# Patient Record
Sex: FEMALE | Race: WHITE | NOT HISPANIC OR LATINO | Employment: FULL TIME | URBAN - METROPOLITAN AREA
[De-identification: names, ages, dates, MRNs, and addresses within clinical notes are randomized per-mention and may not be internally consistent; named-entity substitution may affect disease eponyms.]

---

## 2022-10-20 ENCOUNTER — OFFICE VISIT (OUTPATIENT)
Dept: FAMILY MEDICINE CLINIC | Facility: CLINIC | Age: 47
End: 2022-10-20

## 2022-10-20 VITALS
SYSTOLIC BLOOD PRESSURE: 145 MMHG | BODY MASS INDEX: 36.37 KG/M2 | OXYGEN SATURATION: 98 % | TEMPERATURE: 95.7 F | WEIGHT: 213 LBS | HEIGHT: 64 IN | DIASTOLIC BLOOD PRESSURE: 90 MMHG | HEART RATE: 88 BPM

## 2022-10-20 DIAGNOSIS — H65.93 BILATERAL NON-SUPPURATIVE OTITIS MEDIA: Primary | ICD-10-CM

## 2022-10-20 PROBLEM — E66.9 OBESITY (BMI 30-39.9): Status: ACTIVE | Noted: 2022-10-20

## 2022-10-20 PROBLEM — Z80.3 FAMILY HISTORY OF BREAST CANCER: Status: ACTIVE | Noted: 2019-10-14

## 2022-10-20 PROBLEM — I10 BENIGN ESSENTIAL HYPERTENSION: Status: ACTIVE | Noted: 2022-06-29

## 2022-10-20 PROCEDURE — AMOXICILLIN 500MG 21 AMOXICILLIN 500MG 21: Performed by: NURSE PRACTITIONER

## 2022-10-20 PROCEDURE — 99203 OFFICE O/P NEW LOW 30 MIN: CPT | Performed by: NURSE PRACTITIONER

## 2022-10-20 RX ORDER — LOSARTAN POTASSIUM 100 MG/1
1 TABLET ORAL DAILY
COMMUNITY
Start: 2022-07-28 | End: 2023-07-28

## 2022-10-20 RX ORDER — AMOXICILLIN 500 MG/1
500 TABLET, FILM COATED ORAL 3 TIMES DAILY
Qty: 21 TABLET | Refills: 0
Start: 2022-10-22 | End: 2022-10-29

## 2022-10-20 NOTE — PROGRESS NOTES
Name: Xin Guan      : 1975      MRN: [id number]  Encounter Provider: RADHA Guido  Encounter Date: 10/20/2022   Encounter department: [address] [place] [address] test not indicated today as the patient has already had symptoms for 5 days  Encouraged patient to wear a mask for the next 5 days just to be safe  Primary differential diagnoses at this time:  Viral URI, acute rhinosinusitis, bilateral otitis media  Etiology is likely viral at this point but I did prescribe the patient a 7 day regimen of amoxicillin to start on Saturday if signs and symptoms fail to improve  Discussed with signs and symptoms warrant immediate medical attention, patient verbalized understanding  1  Bilateral non-suppurative otitis media  -     amoxicillin (AMOXIL) 500 MG tablet; Take 1 tablet (500 mg total) by mouth 3 (three) times a day for 7 days Do not start before 2022  Subjective        Ear Pain  Patient complains of ear pain and possible ear infection  Symptoms include bilateral ear itchiness and decreased hearing  Onset of symptoms was 5 days ago, and have been gradually worsening since that time  Associated symptoms include: post nasal drip, congestion, sinus pressure and fatigue  Patient denies: achiness, chills and fever   She is drinking plenty of fluids  Several sick contacts at work  Has not checked covid test  Never diagnosed with covid  Had 2 covid vaccines  No recent abx use  Hx of recurrent ear infections (2-3x year)    Review of Systems   Constitutional: Positive for fatigue  Negative for chills and fever  HENT: Positive for congestion, ear pain, postnasal drip, sinus pressure and sore throat  Negative for ear discharge  Eyes: Negative  Respiratory: Negative  Cardiovascular: Negative  Gastrointestinal: Negative  Endocrine: Negative  Genitourinary: Negative  Musculoskeletal: Negative  Skin: Negative  Allergic/Immunologic: Negative  Neurological: Negative  Hematological: Negative  Psychiatric/Behavioral: Negative  Current Outpatient Medications on File Prior to Visit   Medication Sig   • B Complex-C (VITAMIN B + C COMPLEX PO) Take 1 capsule by mouth in the morning   • losartan (COZAAR) 100 MG tablet Take 1 tablet by mouth daily       Objective     /90 (BP Location: Right arm, Patient Position: Sitting, Cuff Size: Large)   Pulse 88   Temp (!) 95 7 °F (35 4 °C) (Axillary)   Ht 5' 4" (1 626 m)   Wt 96 6 kg (213 lb)   SpO2 98%   BMI 36 56 kg/m²     Physical Exam  Constitutional:       General: She is not in acute distress  Appearance: Normal appearance  HENT:      Head: Normocephalic  Right Ear: Tympanic membrane normal  There is no impacted cerumen  Left Ear: Tympanic membrane normal  There is no impacted cerumen  Ears:      Comments: B/l ear canals red and inflammed     Nose: Congestion present  Mouth/Throat:      Mouth: Mucous membranes are moist       Pharynx: Oropharynx is clear  Posterior oropharyngeal erythema present  No oropharyngeal exudate  Eyes:      Conjunctiva/sclera: Conjunctivae normal    Cardiovascular:      Rate and Rhythm: Normal rate and regular rhythm  Pulses: Normal pulses  Heart sounds: Normal heart sounds  Pulmonary:      Effort: Pulmonary effort is normal  No respiratory distress  Breath sounds: No wheezing  Musculoskeletal:         General: Normal range of motion  Cervical back: Normal range of motion  Lymphadenopathy:      Cervical: No cervical adenopathy  Skin:     General: Skin is warm and dry  Findings: No rash  Neurological:      Mental Status: She is alert and oriented to person, place, and time     Psychiatric:         Mood and Affect: Mood normal          Behavior: Behavior normal        RADHA Mistry

## 2022-10-20 NOTE — PATIENT INSTRUCTIONS
Treatment- Increase fluids, rest, acetaminophen or ibuprofen for fever/aches, OTC cold remedies OK but not too helpful, avoid OTC nasal sprays-may use them for 1-2 days only due to rebound effect  Chicken soup, tea with honey and lemon (grandma's remedies work as well if not better than OTC's)      Viral symptoms should resolve in 1-2 weeks, please contact the office if issues persist

## 2024-01-12 NOTE — PROGRESS NOTES
ADULT ANNUAL PHYSICAL  [facility] IN PARTNERSHIP WITH [facility]'S    NAME: Esther Angeles  AGE: 48 y.o. SEX: female  : 1975     DATE: 2024     Assessment and Plan:     Problem List Items Addressed This Visit       Benign essential hypertension    Relevant Orders    CBC and differential    Comprehensive metabolic panel    Lipid panel    POCT hemoglobin A1c    T3    TSH+Free T4    Ambulatory referral to complex care management program    Obesity (BMI 30-39.9)    Relevant Orders    CBC and differential    Comprehensive metabolic panel    Lipid panel    POCT hemoglobin A1c    T3    TSH+Free T4    Ambulatory referral to complex care management program    Prediabetes     A1C is 6.1 today.          Other Visit Diagnoses       Annual physical exam    -  Primary    Relevant Orders    CBC and differential    Comprehensive metabolic panel    Lipid panel    POCT hemoglobin A1c    Screening mammogram for breast cancer        Patient has mammograms done annually.  Ordered by GYN.    Cervical cancer screening        Up-to-date on Pap    Encounter for vaccination        Declined Tdap    Colon cancer screening        Declined colon cancer screening.            Immunizations and preventive care screenings were discussed with patient today. Appropriate education was printed on patient's after visit summary.    Counseling:  Alcohol/drug use: discussed moderation in alcohol intake, the recommendations for healthy alcohol use, and avoidance of illicit drug use.  Dental Health: discussed importance of regular tooth brushing, flossing, and dental visits.  Exercise: the importance of regular exercise/physical activity was discussed. Recommend exercise 3-5 times per week for at least 30 minutes.          Patient was seen today for an annual physical exam. Age appropriate screening tests were done as above. Annual labs were ordered to be done through [employer]  [facility]. Patient will be contacted regarding results and follow up will be based on findings. Patient was counseled on the importance of proper diet and exercise. I recommend diets rich in lean meats and leafy green vegetables. Try to have 150 minutes of exercise weekly at moderate intensity. See problem based charting for any chronic problems or new findings and current workup/management.    Will refer patient to our care management for dietary and nutritional counseling due to elevated A1c at 6.1 today.  Will follow-up in 6 months for A1c check.    40 minutes were spent on chart review, examination, and plan of care. This note was dictated using software.     Return in about 6 months (around 2024) for Recheck a1c, nurse visit for labs.     Chief Complaint:     Chief Complaint   Patient presents with    Physical Exam      History of Present Illness:     Adult Annual Physical   Patient here for a comprehensive physical exam. The patient reports  annual .    Diet and Physical Activity  Diet/Nutrition:  mixed diet, large amounts of fruits .   Exercise: walking.      Depression Screening  PHQ-2/9 Depression Screening           General Health  Sleep: sleeps well.   Hearing:  ear ringing .  Vision: wears glasses.   Dental: regular dental visits.       /GYN Health  Follows with gynecology? yes        Review of Systems:     Review of Systems   Respiratory:  Negative for shortness of breath.    Cardiovascular:  Negative for chest pain.   Gastrointestinal:  Negative for abdominal pain, constipation and diarrhea.   Genitourinary:  Negative for difficulty urinating.   Skin:  Negative for rash.      Past Medical History:     Past Medical History:   Diagnosis Date    High blood pressure       Past Surgical History:     Past Surgical History:   Procedure Laterality Date     SECTION        Social History:     Social History     Socioeconomic History    Marital status: /Civil Union     Spouse name: None     "Number of children: None    Years of education: None    Highest education level: None   Occupational History    None   Tobacco Use    Smoking status: Never    Smokeless tobacco: Never   Vaping Use    Vaping status: Never Used   Substance and Sexual Activity    Alcohol use: Yes     Comment: once a month    Drug use: Never    Sexual activity: None   Other Topics Concern    None   Social History Narrative    None     Social Determinants of Health     Financial Resource Strain: Not on file   Food Insecurity: Not on file   Transportation Needs: Not on file   Physical Activity: Not on file   Stress: Not on file   Social Connections: Not on file   Intimate Partner Violence: Not on file   Housing Stability: Not on file      Family History:     History reviewed. No pertinent family history.   Current Medications:     Current Outpatient Medications   Medication Sig Dispense Refill    amoxicillin (AMOXIL) 500 mg capsule Take 1 capsule (500 mg total) by mouth every 8 (eight) hours for 7 days      B Complex-C (VITAMIN B + C COMPLEX PO) Take 1 capsule by mouth in the morning      chlorthalidone 25 mg tablet Take 25 mg by mouth daily      Dextromethorphan Polistirex ER (Delsym) 30 MG/5ML SUER Take 5 mL (30 mg total) by mouth every 12 (twelve) hours as needed (cough)      Magnesium 200 MG TABS Use      potassium chloride (K-DUR,KLOR-CON) 10 mEq tablet       losartan (COZAAR) 100 MG tablet Take 1 tablet by mouth daily       No current facility-administered medications for this visit.      Allergies:     No Known Allergies   Physical Exam:     /76 (BP Location: Left arm, Patient Position: Sitting, Cuff Size: Large)   Pulse 98   Temp 98.6 °F (37 °C)   Ht 5' 4\" (1.626 m)   Wt 95.4 kg (210 lb 6.4 oz)   SpO2 98%   BMI 36.12 kg/m²     Physical Exam  Vitals and nursing note reviewed.   Constitutional:       General: She is not in acute distress.     Appearance: Normal appearance. She is well-developed.   HENT:      Head: " Normocephalic and atraumatic.      Right Ear: Tympanic membrane, ear canal and external ear normal.      Left Ear: Tympanic membrane, ear canal and external ear normal.      Nose: Nose normal. No congestion.      Mouth/Throat:      Mouth: Mucous membranes are moist.      Pharynx: No oropharyngeal exudate or posterior oropharyngeal erythema.   Eyes:      Extraocular Movements: Extraocular movements intact.      Conjunctiva/sclera: Conjunctivae normal.      Pupils: Pupils are equal, round, and reactive to light.   Cardiovascular:      Rate and Rhythm: Normal rate and regular rhythm.      Heart sounds: Normal heart sounds. No murmur heard.  Pulmonary:      Effort: Pulmonary effort is normal. No respiratory distress.      Breath sounds: Normal breath sounds. No wheezing.   Abdominal:      General: Abdomen is flat.      Palpations: Abdomen is soft.      Tenderness: There is no abdominal tenderness. There is no guarding.   Musculoskeletal:         General: Normal range of motion.      Cervical back: Normal range of motion and neck supple.   Lymphadenopathy:      Cervical: No cervical adenopathy.   Skin:     General: Skin is warm and dry.      Findings: No rash.   Neurological:      General: No focal deficit present.      Mental Status: She is alert and oriented to person, place, and time. Mental status is at baseline.   Psychiatric:         Mood and Affect: Mood normal.         Behavior: Behavior normal.         Thought Content: Thought content normal.         Judgment: Judgment normal.            Baljinder Nicole DO  [facility] IN PARTNERSHIP WITH [facility]

## 2024-01-15 ENCOUNTER — OFFICE VISIT (OUTPATIENT)
Dept: FAMILY MEDICINE CLINIC | Facility: CLINIC | Age: 49
End: 2024-01-15

## 2024-01-15 VITALS
TEMPERATURE: 99.3 F | BODY MASS INDEX: 35.85 KG/M2 | WEIGHT: 210 LBS | SYSTOLIC BLOOD PRESSURE: 122 MMHG | HEART RATE: 114 BPM | HEIGHT: 64 IN | OXYGEN SATURATION: 98 % | DIASTOLIC BLOOD PRESSURE: 78 MMHG

## 2024-01-15 DIAGNOSIS — H66.91 RIGHT OTITIS MEDIA, UNSPECIFIED OTITIS MEDIA TYPE: Primary | ICD-10-CM

## 2024-01-15 DIAGNOSIS — R05.1 ACUTE COUGH: ICD-10-CM

## 2024-01-15 PROCEDURE — 99213 OFFICE O/P EST LOW 20 MIN: CPT | Performed by: NURSE PRACTITIONER

## 2024-01-15 PROCEDURE — AMOXICILLIN 500MG 21 AMOXICILLIN 500MG 21: Performed by: STUDENT IN AN ORGANIZED HEALTH CARE EDUCATION/TRAINING PROGRAM

## 2024-01-15 PROCEDURE — DELSYM ADULT COUGH DELSYM ADULT COUGH: Performed by: STUDENT IN AN ORGANIZED HEALTH CARE EDUCATION/TRAINING PROGRAM

## 2024-01-15 RX ORDER — CHLORTHALIDONE 25 MG/1
25 TABLET ORAL DAILY
COMMUNITY
Start: 2023-06-08 | End: 2024-06-07

## 2024-01-15 RX ORDER — POTASSIUM CHLORIDE 750 MG/1
TABLET, EXTENDED RELEASE ORAL
COMMUNITY
Start: 2023-11-23

## 2024-01-15 RX ORDER — DEXTROMETHORPHAN POLISTIREX 30 MG/5ML
5 SUSPENSION ORAL EVERY 12 HOURS PRN
Start: 2024-01-15

## 2024-01-15 RX ORDER — MAGNESIUM 200 MG
TABLET ORAL
COMMUNITY

## 2024-01-15 RX ORDER — AMOXICILLIN 500 MG/1
500 CAPSULE ORAL EVERY 8 HOURS SCHEDULED
Start: 2024-01-15 | End: 2024-01-22

## 2024-01-15 NOTE — ASSESSMENT & PLAN NOTE
Patient given amoxicillin in office today and delsym for cough. Advised on SE and use of both medications. You can take Tylenol/Ibuprofen as needed for pain. Increase fluids and rest. F/U if no improvement with PCP or in our office.

## 2024-01-15 NOTE — PROGRESS NOTES
Name: Esther Angeles      : 1975      MRN: [id number]  Encounter Provider: RADHA Venegas  Encounter Date: 1/15/2024   Encounter department: [facility] IN PARTNERSHIP WITH [facility]    Assessment & Plan     1. Right otitis media, unspecified otitis media type  Assessment & Plan:  Patient given amoxicillin in office today and delsym for cough. Advised on SE and use of both medications. You can take Tylenol/Ibuprofen as needed for pain. Increase fluids and rest. F/U if no improvement with PCP or in our office.     Orders:  -     amoxicillin (AMOXIL) 500 mg capsule; Take 1 capsule (500 mg total) by mouth every 8 (eight) hours for 7 days  -     Dextromethorphan Polistirex ER (Delsym) 30 MG/5ML SUER; Take 5 mL (30 mg total) by mouth every 12 (twelve) hours as needed (cough)    2. Acute cough  -     amoxicillin (AMOXIL) 500 mg capsule; Take 1 capsule (500 mg total) by mouth every 8 (eight) hours for 7 days  -     Dextromethorphan Polistirex ER (Delsym) 30 MG/5ML SUER; Take 5 mL (30 mg total) by mouth every 12 (twelve) hours as needed (cough)         Subjective      Earache   There is pain in the right ear. This is a new problem. The current episode started in the past 7 days. The problem occurs constantly. The problem has been gradually worsening. There has been no fever. The pain is moderate. Associated symptoms include coughing (triggered by cold weather). Pertinent negatives include no abdominal pain, diarrhea, ear discharge, headaches, hearing loss, neck pain, rash, rhinorrhea, sore throat (irritation from coughing) or vomiting. Treatments tried: Tylenol cold and sinus. The treatment provided mild relief. Her past medical history is significant for a chronic ear infection. There is no history of hearing loss or a tympanostomy tube.     Review of Systems   Constitutional: Negative.    HENT:  Positive for ear pain and postnasal drip. Negative for congestion,  "ear discharge, hearing loss, nosebleeds, rhinorrhea, sinus pressure, sinus pain and sore throat (irritation from coughing).    Eyes: Negative.    Respiratory:  Positive for cough (triggered by cold weather). Negative for apnea, choking, chest tightness, shortness of breath, wheezing and stridor.    Gastrointestinal: Negative.  Negative for abdominal pain, diarrhea and vomiting.   Genitourinary: Negative.    Musculoskeletal: Negative.  Negative for neck pain.   Skin: Negative.  Negative for rash.   Neurological: Negative.  Negative for headaches.       Current Outpatient Medications on File Prior to Visit   Medication Sig   • B Complex-C (VITAMIN B + C COMPLEX PO) Take 1 capsule by mouth in the morning   • chlorthalidone 25 mg tablet Take 25 mg by mouth daily   • Magnesium 200 MG TABS Use   • potassium chloride (K-DUR,KLOR-CON) 10 mEq tablet    • losartan (COZAAR) 100 MG tablet Take 1 tablet by mouth daily       Objective     /78 (BP Location: Right arm, Patient Position: Sitting, Cuff Size: Large)   Pulse (!) 114   Temp 99.3 °F (37.4 °C)   Ht 5' 4\" (1.626 m)   Wt 95.3 kg (210 lb)   SpO2 98%   BMI 36.05 kg/m²     Physical Exam  Vitals and nursing note reviewed.   Constitutional:       General: She is not in acute distress.     Appearance: Normal appearance.   HENT:      Head: Normocephalic and atraumatic.      Right Ear: Hearing, ear canal and external ear normal. Swelling present. No drainage. Tympanic membrane is injected, erythematous and bulging.      Left Ear: Hearing, ear canal and external ear normal. No drainage or swelling. Tympanic membrane is injected. Tympanic membrane is not erythematous or bulging.      Nose: Nose normal.   Eyes:      General:         Right eye: No discharge.         Left eye: No discharge.      Conjunctiva/sclera: Conjunctivae normal.   Cardiovascular:      Rate and Rhythm: Normal rate and regular rhythm.      Heart sounds: Normal heart sounds. No murmur heard.  Pulmonary: "      Effort: Pulmonary effort is normal.      Breath sounds: Normal breath sounds.   Abdominal:      General: Bowel sounds are normal.      Palpations: Abdomen is soft.   Musculoskeletal:         General: Normal range of motion.      Cervical back: Normal range of motion.      Right lower leg: No edema.      Left lower leg: No edema.   Lymphadenopathy:      Cervical: No cervical adenopathy.   Skin:     General: Skin is warm and dry.   Neurological:      Mental Status: She is alert and oriented to person, place, and time.   Psychiatric:         Mood and Affect: Mood normal.         Behavior: Behavior normal.         Thought Content: Thought content normal.         Judgment: Judgment normal.       RADHA Venegas

## 2024-01-19 ENCOUNTER — TELEPHONE (OUTPATIENT)
Dept: ADMINISTRATIVE | Facility: OTHER | Age: 49
End: 2024-01-19

## 2024-01-19 ENCOUNTER — OFFICE VISIT (OUTPATIENT)
Dept: FAMILY MEDICINE CLINIC | Facility: CLINIC | Age: 49
End: 2024-01-19

## 2024-01-19 VITALS
DIASTOLIC BLOOD PRESSURE: 76 MMHG | HEIGHT: 64 IN | HEART RATE: 98 BPM | WEIGHT: 210.4 LBS | OXYGEN SATURATION: 98 % | TEMPERATURE: 98.6 F | SYSTOLIC BLOOD PRESSURE: 124 MMHG | BODY MASS INDEX: 35.92 KG/M2

## 2024-01-19 DIAGNOSIS — Z00.00 ANNUAL PHYSICAL EXAM: Primary | ICD-10-CM

## 2024-01-19 DIAGNOSIS — I10 BENIGN ESSENTIAL HYPERTENSION: ICD-10-CM

## 2024-01-19 DIAGNOSIS — R73.03 PREDIABETES: ICD-10-CM

## 2024-01-19 DIAGNOSIS — Z23 ENCOUNTER FOR VACCINATION: ICD-10-CM

## 2024-01-19 DIAGNOSIS — Z12.31 SCREENING MAMMOGRAM FOR BREAST CANCER: ICD-10-CM

## 2024-01-19 DIAGNOSIS — Z12.4 CERVICAL CANCER SCREENING: ICD-10-CM

## 2024-01-19 DIAGNOSIS — E66.9 OBESITY (BMI 30-39.9): ICD-10-CM

## 2024-01-19 DIAGNOSIS — Z12.11 COLON CANCER SCREENING: ICD-10-CM

## 2024-01-19 PROCEDURE — 99386 PREV VISIT NEW AGE 40-64: CPT | Performed by: STUDENT IN AN ORGANIZED HEALTH CARE EDUCATION/TRAINING PROGRAM

## 2024-01-19 NOTE — TELEPHONE ENCOUNTER
Upon review of the In Basket request we were able to locate, review, and update the patient chart as requested for Pap Smear (HPV) aka Cervical Cancer Screening.    Any additional questions or concerns should be emailed to the Practice Liaisons via the appropriate education email address, please do not reply via In Basket.    Thank you  Christine Cordon

## 2024-01-19 NOTE — TELEPHONE ENCOUNTER
----- Message from Remedios Gonzales sent at 1/19/2024 11:23 AM EST -----  Regarding: Care Gap Request  01/19/24 11:23 AM    Hello, our patient attached above has had Pap Smear (HPV) aka Cervical Cancer Screening completed/performed. Please assist in updating the patient chart by pulling the Care Everywhere (CE) document. The date of service is 9/8/2023  .     Thank you,  Remedios WEBSTER [facility]

## 2024-01-19 NOTE — TELEPHONE ENCOUNTER
----- Message from Remedios Gonzales sent at 1/19/2024 11:23 AM EST -----  Regarding: Care Gap Request  01/19/24 11:23 AM    Hello, our patient attached above has had Pap Smear (HPV) aka Cervical Cancer Screening completed/performed. Please assist in updating the patient chart by pulling the Care Everywhere (CE) document. The date of service is 2/3/2021.     Thank you,  Remedios WEBSTER [facility]

## 2024-01-19 NOTE — TELEPHONE ENCOUNTER
Upon review of the In Basket request we were able to locate, review, and update the patient chart as requested for Pap Smear (HPV) aka Cervical Cancer Screening.    Any additional questions or concerns should be emailed to the Practice Liaisons via the appropriate education email address, please do not reply via In Basket.    Thank you  Delmis Michael

## 2024-01-23 ENCOUNTER — PATIENT OUTREACH (OUTPATIENT)
Age: 49
End: 2024-01-23

## 2024-01-23 NOTE — PROGRESS NOTES
Called Patient to introduce them to care management program. No answer, voicemail left with callback information. My Chart message sent as well with request to respond with best day and time for outreach. Will reach out again if no return response.

## 2024-02-01 LAB
BASOPHILS # BLD AUTO: 28 CELLS/UL (ref 0–200)
BASOPHILS NFR BLD AUTO: 0.4 %
CHOLEST SERPL-MCNC: 168 MG/DL
CHOLEST/HDLC SERPL: 4 (CALC)
EOSINOPHIL # BLD AUTO: 121 CELLS/UL (ref 15–500)
EOSINOPHIL NFR BLD AUTO: 1.7 %
ERYTHROCYTE [DISTWIDTH] IN BLOOD BY AUTOMATED COUNT: 11.8 % (ref 11–15)
HCT VFR BLD AUTO: 39.5 % (ref 35–45)
HDLC SERPL-MCNC: 42 MG/DL
HGB BLD-MCNC: 13.3 G/DL (ref 11.7–15.5)
LDLC SERPL CALC-MCNC: 107 MG/DL (CALC)
LYMPHOCYTES # BLD AUTO: 1846 CELLS/UL (ref 850–3900)
LYMPHOCYTES NFR BLD AUTO: 26 %
MCH RBC QN AUTO: 29.8 PG (ref 27–33)
MCHC RBC AUTO-ENTMCNC: 33.7 G/DL (ref 32–36)
MCV RBC AUTO: 88.6 FL (ref 80–100)
MONOCYTES # BLD AUTO: 426 CELLS/UL (ref 200–950)
MONOCYTES NFR BLD AUTO: 6 %
NEUTROPHILS # BLD AUTO: 4679 CELLS/UL (ref 1500–7800)
NEUTROPHILS NFR BLD AUTO: 65.9 %
NONHDLC SERPL-MCNC: 126 MG/DL (CALC)
PLATELET # BLD AUTO: 289 THOUSAND/UL (ref 140–400)
PMV BLD REES-ECKER: 10.3 FL (ref 7.5–12.5)
RBC # BLD AUTO: 4.46 MILLION/UL (ref 3.8–5.1)
T4 FREE SERPL-MCNC: 1.1 NG/DL (ref 0.8–1.8)
TRIGL SERPL-MCNC: 94 MG/DL
TSH SERPL-ACNC: 2.14 MIU/L
WBC # BLD AUTO: 7.1 THOUSAND/UL (ref 3.8–10.8)

## 2024-02-02 PROBLEM — E78.2 MIXED HYPERLIPIDEMIA: Status: ACTIVE | Noted: 2024-02-02

## 2024-02-04 DIAGNOSIS — H66.91 RIGHT OTITIS MEDIA, UNSPECIFIED OTITIS MEDIA TYPE: Primary | ICD-10-CM

## 2024-02-04 RX ORDER — AMOXICILLIN AND CLAVULANATE POTASSIUM 875; 125 MG/1; MG/1
1 TABLET, FILM COATED ORAL EVERY 12 HOURS SCHEDULED
Qty: 20 TABLET | Refills: 0 | Status: SHIPPED | OUTPATIENT
Start: 2024-02-04 | End: 2024-02-14

## 2024-02-07 ENCOUNTER — PATIENT OUTREACH (OUTPATIENT)
Dept: FAMILY MEDICINE CLINIC | Facility: CLINIC | Age: 49
End: 2024-02-07

## 2024-02-07 NOTE — LETTER
Date: 02/07/24    Dear Esther Angeles,   I am a registered nurse care manager working with [facility] IN PARTNERSHIP WITH [facility]  [address]  [place] [address].   I have not been able to reach you and would like to set a time that I can talk with you over the phone or in-person.  My work is to help patients that have complex medical conditions get the care they need. This includes patients who may have been in the hospital or emergency room.  Along with preventative care such as pre-diabetes, hypertension, high cholesterol or obesity.  Please call me to schedule an initial outreach. I look forward to meeting with you.  Sincerely,  CARMEN Nguyen RN  [phone]  Outpatient Care Manager

## 2024-02-07 NOTE — PROGRESS NOTES
Patient did not respond to initial phone call or [employer] message. Unable to reach letter sent through [employer].

## 2024-02-21 PROBLEM — R05.1 ACUTE COUGH: Status: RESOLVED | Noted: 2024-01-15 | Resolved: 2024-02-21

## 2024-03-27 ENCOUNTER — TELEPHONE (OUTPATIENT)
Dept: ADMINISTRATIVE | Facility: OTHER | Age: 49
End: 2024-03-27

## 2024-03-27 NOTE — TELEPHONE ENCOUNTER
Upon review of the In Basket request we were able to note that no further action is required. The patient chart is up to date as a result of a previous request.      Any additional questions or concerns should be emailed to the Practice Liaisons via the appropriate education email address, please do not reply via In Basket.    Thank you  LISET ANNE

## 2024-03-27 NOTE — TELEPHONE ENCOUNTER
----- Message from Bety Khoury sent at 3/27/2024  8:10 AM EDT -----  Regarding: Care gap request  03/27/24 8:10 AM    Fátima, our patient Esther Angeles has had Mammogram completed/performed. Please assist in updating the patient chart by pulling the Care Everywhere (CE) document. The date of service is 03/02/2023.     Thank you,  Bety WEBSTER [facility]

## 2024-06-02 DIAGNOSIS — I10 BENIGN ESSENTIAL HYPERTENSION: Primary | ICD-10-CM

## 2024-06-03 DIAGNOSIS — I10 BENIGN ESSENTIAL HYPERTENSION: ICD-10-CM

## 2024-06-03 RX ORDER — CHLORTHALIDONE 25 MG/1
25 TABLET ORAL DAILY
Qty: 30 TABLET | Refills: 0 | Status: SHIPPED | OUTPATIENT
Start: 2024-06-03 | End: 2024-06-03 | Stop reason: SDUPTHER

## 2024-06-03 RX ORDER — LOSARTAN POTASSIUM 100 MG/1
100 TABLET ORAL DAILY
Qty: 30 TABLET | Refills: 0 | Status: SHIPPED | OUTPATIENT
Start: 2024-06-03 | End: 2024-06-03 | Stop reason: SDUPTHER

## 2024-06-03 RX ORDER — CHLORTHALIDONE 25 MG/1
25 TABLET ORAL DAILY
Qty: 90 TABLET | Refills: 3 | Status: SHIPPED | OUTPATIENT
Start: 2024-06-03 | End: 2025-06-03

## 2024-06-03 RX ORDER — LOSARTAN POTASSIUM 100 MG/1
100 TABLET ORAL DAILY
Qty: 90 TABLET | Refills: 3 | Status: SHIPPED | OUTPATIENT
Start: 2024-06-03 | End: 2025-06-03

## 2024-06-30 ENCOUNTER — RA CDI HCC (OUTPATIENT)
Dept: OTHER | Facility: HOSPITAL | Age: 49
End: 2024-06-30

## 2024-06-30 NOTE — PROGRESS NOTES
Ambulatory Visit  Name: Esther Angeles      : 1975      MRN: [id number]  Encounter Provider: Baljinder Nicole DO  Encounter Date: 7/10/2024   Encounter department: [facility] IN PARTNERSHIP WITH [facility]    Assessment & Plan   1. Benign essential hypertension  -     POCT hemoglobin A1c  -     Basic metabolic panel  -     potassium chloride (Klor-Con M10) 10 mEq tablet; Take 1 tablet (10 mEq total) by mouth daily  2. Prediabetes  Assessment & Plan:  Patient states that she has not changed too much with the diet.   Orders:  -     POCT hemoglobin A1c  -     Basic metabolic panel  -     potassium chloride (Klor-Con M10) 10 mEq tablet; Take 1 tablet (10 mEq total) by mouth daily  3. Left upper quadrant pain  Assessment & Plan:  Patient describes a left upper quadrant pain that is sharp under the rib cage.  It lasts for minutes to an hour and typically feels like a pulsation feeling or tightness.  It is intermittent in nature.  It happens every 6 weeks.  Current differential includes musculoskeletal causes, nerve impingement, gastrointestinal pathology.  I recommended trying a heating pad and anti-inflammatories when the episodes occur.  If symptoms become more frequent or worsen in severity we will ultrasound the stomach and pelvis to evaluate for abdominal and ovarian pathology.  Orders:  -     Basic metabolic panel         Patient is a 48-year-old female presenting for follow-up today on weight and prediabetes.  I referred her to our care management which she did not work with.  We will recheck her A1c today as it was close to diabetic range.    History of Present Illness     HPI    Review of Systems   Constitutional:  Negative for fever.   Respiratory:  Negative for shortness of breath.    Cardiovascular:  Negative for chest pain.   Gastrointestinal:  Negative for abdominal pain, constipation and diarrhea.   Genitourinary:  Negative for difficulty urinating.   Skin:   "Negative for rash.     Is a 48-year-old female who is presenting today for follow-up on prediabetes.  Will recheck A1c today.  She has not changed anything with her diet currently.    She has been having an intermittent left upper quadrant pain that is underneath the ribs around the spleen area.  It feels like a sharp sensation or tightness and states that she has some issues on the left side of her stomach from the birth of her previous child.  This could be musculoskeletal related or nerve impingement.  She does not report any abnormal bowel type changes    Objective     /82   Pulse 105   Ht 5' 4\" (1.626 m)   Wt 95.3 kg (210 lb)   SpO2 97%   BMI 36.05 kg/m²     Physical Exam  Vitals and nursing note reviewed.   Constitutional:       General: She is not in acute distress.     Appearance: Normal appearance. She is well-developed. She is obese.   HENT:      Head: Normocephalic and atraumatic.      Right Ear: External ear normal.      Left Ear: External ear normal.      Nose: Nose normal.      Mouth/Throat:      Mouth: Mucous membranes are moist.   Eyes:      Extraocular Movements: Extraocular movements intact.      Conjunctiva/sclera: Conjunctivae normal.      Pupils: Pupils are equal, round, and reactive to light.   Cardiovascular:      Rate and Rhythm: Normal rate and regular rhythm.      Heart sounds: Normal heart sounds. No murmur heard.  Pulmonary:      Effort: Pulmonary effort is normal. No respiratory distress.      Breath sounds: Normal breath sounds. No wheezing.   Musculoskeletal:         General: Normal range of motion.      Cervical back: Normal range of motion.   Skin:     General: Skin is warm and dry.      Findings: No rash.   Neurological:      General: No focal deficit present.      Mental Status: She is alert and oriented to person, place, and time. Mental status is at baseline.   Psychiatric:         Mood and Affect: Mood normal.         Behavior: Behavior normal.         Thought " Content: Thought content normal.         Judgment: Judgment normal.       Administrative Statements   I have spent a total time of 20 minutes in caring for this patient on the day of the visit/encounter including Instructions for management, Documenting in the medical record, and Obtaining or reviewing history  .

## 2024-07-10 ENCOUNTER — OFFICE VISIT (OUTPATIENT)
Dept: FAMILY MEDICINE CLINIC | Facility: CLINIC | Age: 49
End: 2024-07-10

## 2024-07-10 VITALS
HEIGHT: 64 IN | SYSTOLIC BLOOD PRESSURE: 128 MMHG | WEIGHT: 210 LBS | HEART RATE: 105 BPM | OXYGEN SATURATION: 97 % | BODY MASS INDEX: 35.85 KG/M2 | DIASTOLIC BLOOD PRESSURE: 82 MMHG

## 2024-07-10 DIAGNOSIS — I10 BENIGN ESSENTIAL HYPERTENSION: Primary | ICD-10-CM

## 2024-07-10 DIAGNOSIS — R73.03 PREDIABETES: ICD-10-CM

## 2024-07-10 DIAGNOSIS — R10.12 LEFT UPPER QUADRANT PAIN: ICD-10-CM

## 2024-07-10 LAB — SL AMB POCT HEMOGLOBIN AIC: 6.1 (ref ?–6.5)

## 2024-07-10 PROCEDURE — 99213 OFFICE O/P EST LOW 20 MIN: CPT | Performed by: STUDENT IN AN ORGANIZED HEALTH CARE EDUCATION/TRAINING PROGRAM

## 2024-07-10 PROCEDURE — 36415 COLL VENOUS BLD VENIPUNCTURE: CPT | Performed by: STUDENT IN AN ORGANIZED HEALTH CARE EDUCATION/TRAINING PROGRAM

## 2024-07-10 PROCEDURE — 83036 HEMOGLOBIN GLYCOSYLATED A1C: CPT | Performed by: STUDENT IN AN ORGANIZED HEALTH CARE EDUCATION/TRAINING PROGRAM

## 2024-07-10 RX ORDER — POTASSIUM CHLORIDE 750 MG/1
10 TABLET, EXTENDED RELEASE ORAL DAILY
Qty: 30 TABLET | Refills: 0 | Status: SHIPPED | OUTPATIENT
Start: 2024-07-10

## 2024-07-10 RX ORDER — POTASSIUM CHLORIDE 750 MG/1
10 TABLET, EXTENDED RELEASE ORAL DAILY
Qty: 90 TABLET | Refills: 3 | Status: SHIPPED | OUTPATIENT
Start: 2024-07-10 | End: 2024-07-10 | Stop reason: SDUPTHER

## 2024-07-10 NOTE — ASSESSMENT & PLAN NOTE
Patient describes a left upper quadrant pain that is sharp under the rib cage.  It lasts for minutes to an hour and typically feels like a pulsation feeling or tightness.  It is intermittent in nature.  It happens every 6 weeks.  Current differential includes musculoskeletal causes, nerve impingement, gastrointestinal pathology.  I recommended trying a heating pad and anti-inflammatories when the episodes occur.  If symptoms become more frequent or worsen in severity we will ultrasound the stomach and pelvis to evaluate for abdominal and ovarian pathology.

## 2024-07-11 LAB
BUN SERPL-MCNC: 11 MG/DL (ref 7–25)
BUN/CREAT SERPL: ABNORMAL (CALC) (ref 6–22)
CALCIUM SERPL-MCNC: 9.5 MG/DL (ref 8.6–10.2)
CHLORIDE SERPL-SCNC: 100 MMOL/L (ref 98–110)
CO2 SERPL-SCNC: 26 MMOL/L (ref 20–32)
CREAT SERPL-MCNC: 0.76 MG/DL (ref 0.5–0.99)
GFR/BSA.PRED SERPLBLD CYS-BASED-ARV: 97 ML/MIN/1.73M2
GLUCOSE SERPL-MCNC: 151 MG/DL (ref 65–99)
POTASSIUM SERPL-SCNC: 3.4 MMOL/L (ref 3.5–5.3)
SODIUM SERPL-SCNC: 136 MMOL/L (ref 135–146)

## 2024-08-11 DIAGNOSIS — I10 BENIGN ESSENTIAL HYPERTENSION: ICD-10-CM

## 2024-08-11 DIAGNOSIS — R73.03 PREDIABETES: ICD-10-CM

## 2024-08-12 RX ORDER — POTASSIUM CHLORIDE 750 MG/1
10 TABLET, EXTENDED RELEASE ORAL DAILY
Qty: 30 TABLET | Refills: 11 | Status: SHIPPED | OUTPATIENT
Start: 2024-08-12

## 2024-09-19 NOTE — PROGRESS NOTES
Pre-operative Clearance  Name: Esther Angeles      : 1975      MRN: [id number]  Encounter Provider: Baljinder Nicole DO  Encounter Date: 2024   Encounter department: [facility] PARTNERSHIP WITH [facility]    Assessment & Plan  Preop examination    Orders:    methylPREDNISolone 4 MG tablet therapy pack; Use as directed on package    Rash    Orders:    methylPREDNISolone 4 MG tablet therapy pack; Use as directed on package    Pre-operative Clearance:     Revised Cardiac Risk Index:  RCI RISK CLASS I (0 risk factors, risk of major cardiac complications approximately 0.5%)    Clearance:  Patient is medically optimized (CLEARED) for proposed surgery without any additional cardiac testing.      Medication Instructions:   - May take tylenol for pain up until the night before surgery    - ACE Inhibitors or ARBs: Continue this medication up to the evening before surgery/procedure, but do not take the morning of the day of surgery.  - Corticosteroids: Continue to take this medication on your normal schedule.  - Diuretics: Continue taking this medication up to the evening before surgery/procedure, but do not take in the morning of the day of surgery/procedure.    DOS: 10/15/24  Surgery: Right toe soft tissue mass excision with nerve decompression with any necessary adjunctive procedure  Dr. Adolph Mcdonald    Patient with Medrol Dosepak for rash listed below.  Continue triamcinolone cream as needed.  Patient should start Medrol Dosepak after lab work will be drawn on Friday as it can interfere/increase blood sugar levels.       History of Present Illness     HPI  Review of Systems   Constitutional:  Negative for fever.   Respiratory:  Negative for shortness of breath.    Cardiovascular:  Negative for chest pain.   Gastrointestinal:  Negative for abdominal pain, constipation and diarrhea.   Genitourinary:  Negative for difficulty urinating.   Skin:  Positive for rash.  "    Patient reports that she has had a red rash around the lower abdomen and groin area and also a spotty red rash around the lower abdomen.  She started using a cream given by her GYN that improved the lower portion of the rash but the red spotty area around the stomach is still present.  Patient denies any pain symptoms.  Rash is located on both sides of the abdomen.  There are no reported new exposures or allergies that could have caused this rash    Past Medical History   Past Medical History:   Diagnosis Date    High blood pressure      Past Surgical History:   Procedure Laterality Date     SECTION  2009     History reviewed. No pertinent family history.  Social History     Tobacco Use    Smoking status: Never    Smokeless tobacco: Never   Vaping Use    Vaping status: Never Used   Substance and Sexual Activity    Alcohol use: Yes     Comment: once a month    Drug use: Never    Sexual activity: Not Currently     Current Outpatient Medications on File Prior to Visit   Medication Sig    B Complex-C (VITAMIN B + C COMPLEX PO) Take 1 capsule by mouth in the morning    chlorthalidone 25 mg tablet Take 1 tablet (25 mg total) by mouth daily    losartan (COZAAR) 100 MG tablet Take 1 tablet (100 mg total) by mouth daily    Magnesium 200 MG TABS Use    potassium chloride (Klor-Con M10) 10 mEq tablet Take 1 tablet (10 mEq total) by mouth daily     No Known Allergies  Objective     /76 (BP Location: Right arm, Patient Position: Sitting, Cuff Size: Large)   Pulse 98   Ht 5' 4\" (1.626 m)   Wt 96.2 kg (212 lb)   SpO2 99%   BMI 36.39 kg/m²     Physical Exam  Vitals and nursing note reviewed.   Constitutional:       General: She is not in acute distress.     Appearance: Normal appearance. She is well-developed.   HENT:      Head: Normocephalic and atraumatic.      Right Ear: Tympanic membrane, ear canal and external ear normal.      Left Ear: Tympanic membrane, ear canal and external ear normal.      Nose: " Nose normal. No congestion.      Mouth/Throat:      Mouth: Mucous membranes are moist.      Pharynx: No oropharyngeal exudate or posterior oropharyngeal erythema.   Eyes:      Extraocular Movements: Extraocular movements intact.      Conjunctiva/sclera: Conjunctivae normal.      Pupils: Pupils are equal, round, and reactive to light.   Cardiovascular:      Rate and Rhythm: Normal rate and regular rhythm.      Heart sounds: Normal heart sounds. No murmur heard.  Pulmonary:      Effort: Pulmonary effort is normal. No respiratory distress.      Breath sounds: Normal breath sounds. No wheezing.   Abdominal:      General: Abdomen is flat.      Palpations: Abdomen is soft.      Tenderness: There is no abdominal tenderness. There is no guarding.   Musculoskeletal:         General: Normal range of motion.      Cervical back: Normal range of motion and neck supple.   Lymphadenopathy:      Cervical: No cervical adenopathy.   Skin:     General: Skin is warm and dry.      Findings: Rash (spotty red rash noted lower abdomen) present.   Neurological:      General: No focal deficit present.      Mental Status: She is alert and oriented to person, place, and time. Mental status is at baseline.   Psychiatric:         Mood and Affect: Mood normal.         Behavior: Behavior normal.         Thought Content: Thought content normal.         Judgment: Judgment normal.       Administrative Statements   I have spent a total time of 35 minutes in caring for this patient on the day of the visit/encounter including Instructions for management, Documenting in the medical record, and Obtaining or reviewing history  .    Baljinder Nicole DO

## 2024-09-25 ENCOUNTER — OFFICE VISIT (OUTPATIENT)
Dept: FAMILY MEDICINE CLINIC | Facility: CLINIC | Age: 49
End: 2024-09-25

## 2024-09-25 VITALS
DIASTOLIC BLOOD PRESSURE: 76 MMHG | WEIGHT: 212 LBS | HEART RATE: 98 BPM | BODY MASS INDEX: 36.19 KG/M2 | HEIGHT: 64 IN | SYSTOLIC BLOOD PRESSURE: 122 MMHG | OXYGEN SATURATION: 99 %

## 2024-09-25 DIAGNOSIS — Z01.818 PREOP EXAMINATION: Primary | ICD-10-CM

## 2024-09-25 DIAGNOSIS — R21 RASH: ICD-10-CM

## 2024-09-25 PROCEDURE — 99214 OFFICE O/P EST MOD 30 MIN: CPT | Performed by: STUDENT IN AN ORGANIZED HEALTH CARE EDUCATION/TRAINING PROGRAM

## 2024-09-25 RX ORDER — METHYLPREDNISOLONE 4 MG
TABLET, DOSE PACK ORAL
Qty: 21 EACH | Refills: 0
Start: 2024-09-25

## 2024-12-31 NOTE — PROGRESS NOTES
Adult Annual Physical  Name: Esther Angeles      : 1975      MRN: [id number]  Encounter Provider: Baljinder Nicole DO  Encounter Date: 1/10/2025   Encounter department: [facility] IN PARTNERSHIP WITH [facility]    Assessment & Plan  Annual physical exam  Reviewed recent labs.  Will follow-up CMP after increasing potassium.       Encounter for vaccination  No vaccines given today.       Benign essential hypertension  Continue current dose of diuretic.  Potassium has dropped and we will start her on 20 mill equivalents daily.  Will recheck CMP in 3 months.  Orders:    potassium chloride (Klor-Con M20) 20 mEq tablet; Take 1 tablet (20 mEq total) by mouth daily    Prediabetes  A1c was 6.3 today.  Discussed diet and exercise habits to reduce A1c.  She has been stationary since her foot surgery.  Increasing exercise will help.  Orders:    Comprehensive metabolic panel; Future    Hemoglobin A1c (w/out EAG); Future    POCT hemoglobin A1c    Obesity (BMI 30-39.9)  A1c was 6.3 today.  Discussed diet and exercise habits to reduce A1c.  She has been stationary since her foot surgery.  Increasing exercise will help.         Mixed hyperlipidemia  Recommend low-fat diet and exercise 3-5 times a week.       Right leg pain  Patient has a pain on the medial aspect of her upper right leg.  This has happened since her foot surgery.  Will follow-up with a vascular venous duplex study.  Orders:    VAS VENOUS DUPLEX -LOWER LIMB UNILATERAL; Future    Immunizations and preventive care screenings were discussed with patient today. Appropriate education was printed on patient's after visit summary.    Counseling:  Dental Health: discussed importance of regular tooth brushing, flossing, and dental visits.  Exercise: the importance of regular exercise/physical activity was discussed. Recommend exercise 3-5 times per week for at least 30 minutes.     BMI Counseling: Body mass index is 36.05 kg/m².  "The BMI is above normal. Nutrition recommendations include encouraging healthy choices of fruits and vegetables. Exercise recommendations include exercising 3-5 times per week. Rationale for BMI follow-up plan is due to patient being overweight or obese.         History of Present Illness     Adult Annual Physical:  Patient presents for annual physical.     Diet and Physical Activity:  - Diet/Nutrition:. waves with fruits and salads; roughly same as last year  - Exercise: walking. recovering from surgery    General Health:  - Sleep: sleeps well.  - Hearing: normal hearing bilateral ears.  - Vision: wears glasses.  - Dental: regular dental visits.    Review of Systems   Constitutional:  Negative for fever.   Respiratory:  Negative for shortness of breath.    Cardiovascular:  Negative for chest pain.   Gastrointestinal:  Negative for abdominal pain, constipation and diarrhea.   Genitourinary:  Negative for difficulty urinating.   Skin:  Negative for rash.   Hematological:         Pain right leg     Patient has a concern about right leg pain on the medial aspect of the upper leg.  She is concerned there may be a vascular abnormality.  She denies any varicose veins.    Objective   /76 (BP Location: Left arm, Patient Position: Sitting, Cuff Size: Large)   Pulse 90   Temp 98.6 °F (37 °C) (Oral)   Ht 5' 4\" (1.626 m)   Wt 95.3 kg (210 lb)   SpO2 99%   BMI 36.05 kg/m²     Physical Exam  Vitals and nursing note reviewed.   Constitutional:       General: She is not in acute distress.     Appearance: Normal appearance. She is well-developed. She is obese.   HENT:      Head: Normocephalic and atraumatic.      Right Ear: Tympanic membrane, ear canal and external ear normal.      Left Ear: Tympanic membrane, ear canal and external ear normal.      Nose: Nose normal. No congestion.      Mouth/Throat:      Mouth: Mucous membranes are moist.      Pharynx: No oropharyngeal exudate or posterior oropharyngeal erythema. "   Eyes:      Conjunctiva/sclera: Conjunctivae normal.   Cardiovascular:      Rate and Rhythm: Normal rate and regular rhythm.      Heart sounds: Normal heart sounds. No murmur heard.  Pulmonary:      Effort: Pulmonary effort is normal. No respiratory distress.      Breath sounds: Normal breath sounds. No wheezing.   Abdominal:      General: Abdomen is flat.      Palpations: Abdomen is soft.      Tenderness: There is no abdominal tenderness. There is no guarding.   Musculoskeletal:         General: Normal range of motion.      Cervical back: Neck supple.   Lymphadenopathy:      Cervical: No cervical adenopathy.   Skin:     General: Skin is warm and dry.      Findings: No rash.   Neurological:      General: No focal deficit present.      Mental Status: She is alert and oriented to person, place, and time. Mental status is at baseline.   Psychiatric:         Mood and Affect: Mood normal.         Behavior: Behavior normal.         Thought Content: Thought content normal.         Judgment: Judgment normal.

## 2024-12-31 NOTE — ASSESSMENT & PLAN NOTE
A1c was 6.3 today.  Discussed diet and exercise habits to reduce A1c.  She has been stationary since her foot surgery.  Increasing exercise will help.

## 2024-12-31 NOTE — ASSESSMENT & PLAN NOTE
A1c was 6.3 today.  Discussed diet and exercise habits to reduce A1c.  She has been stationary since her foot surgery.  Increasing exercise will help.  Orders:    Comprehensive metabolic panel; Future    Hemoglobin A1c (w/out EAG); Future    POCT hemoglobin A1c

## 2024-12-31 NOTE — ASSESSMENT & PLAN NOTE
Continue current dose of diuretic.  Potassium has dropped and we will start her on 20 mill equivalents daily.  Will recheck CMP in 3 months.  Orders:    potassium chloride (Klor-Con M20) 20 mEq tablet; Take 1 tablet (20 mEq total) by mouth daily

## 2025-01-05 ENCOUNTER — RESULTS FOLLOW-UP (OUTPATIENT)
Age: 50
End: 2025-01-05

## 2025-01-05 LAB
ALBUMIN SERPL-MCNC: 4 G/DL (ref 3.6–5.1)
ALBUMIN/GLOB SERPL: 1.5 (CALC) (ref 1–2.5)
ALP SERPL-CCNC: 67 U/L (ref 31–125)
ALT SERPL-CCNC: 14 U/L (ref 6–29)
AST SERPL-CCNC: 11 U/L (ref 10–35)
BASOPHILS # BLD AUTO: 28 CELLS/UL (ref 0–200)
BASOPHILS NFR BLD AUTO: 0.4 %
BILIRUB SERPL-MCNC: 0.5 MG/DL (ref 0.2–1.2)
BUN SERPL-MCNC: 13 MG/DL (ref 7–25)
BUN/CREAT SERPL: ABNORMAL (CALC) (ref 6–22)
CALCIUM SERPL-MCNC: 9.3 MG/DL (ref 8.6–10.2)
CHLORIDE SERPL-SCNC: 101 MMOL/L (ref 98–110)
CHOLEST SERPL-MCNC: 174 MG/DL
CHOLEST/HDLC SERPL: 4 (CALC)
CO2 SERPL-SCNC: 26 MMOL/L (ref 20–32)
CREAT SERPL-MCNC: 0.71 MG/DL (ref 0.5–0.99)
EOSINOPHIL # BLD AUTO: 121 CELLS/UL (ref 15–500)
EOSINOPHIL NFR BLD AUTO: 1.7 %
ERYTHROCYTE [DISTWIDTH] IN BLOOD BY AUTOMATED COUNT: 11.5 % (ref 11–15)
GFR/BSA.PRED SERPLBLD CYS-BASED-ARV: 105 ML/MIN/1.73M2
GLOBULIN SER CALC-MCNC: 2.7 G/DL (CALC) (ref 1.9–3.7)
GLUCOSE SERPL-MCNC: 107 MG/DL (ref 65–99)
HCT VFR BLD AUTO: 38.5 % (ref 35–45)
HDLC SERPL-MCNC: 43 MG/DL
HGB BLD-MCNC: 13.3 G/DL (ref 11.7–15.5)
LDLC SERPL CALC-MCNC: 113 MG/DL (CALC)
LYMPHOCYTES # BLD AUTO: 1896 CELLS/UL (ref 850–3900)
LYMPHOCYTES NFR BLD AUTO: 26.7 %
MCH RBC QN AUTO: 29.9 PG (ref 27–33)
MCHC RBC AUTO-ENTMCNC: 34.5 G/DL (ref 32–36)
MCV RBC AUTO: 86.5 FL (ref 80–100)
MONOCYTES # BLD AUTO: 462 CELLS/UL (ref 200–950)
MONOCYTES NFR BLD AUTO: 6.5 %
NEUTROPHILS # BLD AUTO: 4594 CELLS/UL (ref 1500–7800)
NEUTROPHILS NFR BLD AUTO: 64.7 %
NONHDLC SERPL-MCNC: 131 MG/DL (CALC)
PLATELET # BLD AUTO: 279 THOUSAND/UL (ref 140–400)
PMV BLD REES-ECKER: 10.2 FL (ref 7.5–12.5)
POTASSIUM SERPL-SCNC: 3.3 MMOL/L (ref 3.5–5.3)
PROT SERPL-MCNC: 6.7 G/DL (ref 6.1–8.1)
RBC # BLD AUTO: 4.45 MILLION/UL (ref 3.8–5.1)
SODIUM SERPL-SCNC: 136 MMOL/L (ref 135–146)
T3 SERPL-MCNC: 160 NG/DL (ref 76–181)
T4 FREE SERPL-MCNC: 1 NG/DL (ref 0.8–1.8)
TRIGL SERPL-MCNC: 79 MG/DL
TSH SERPL-ACNC: 2.4 MIU/L
WBC # BLD AUTO: 7.1 THOUSAND/UL (ref 3.8–10.8)

## 2025-01-08 ENCOUNTER — TELEPHONE (OUTPATIENT)
Dept: ADMINISTRATIVE | Facility: OTHER | Age: 50
End: 2025-01-08

## 2025-01-08 NOTE — TELEPHONE ENCOUNTER
Pap Smear: Upon review of the In Basket request we were able to locate, review, and update the patient chart as requested for Pap Smear (HPV) aka Cervical Cancer Screening.    Mammogram: Upon review of the In Basket request we have found/obtained the documentation. After careful review of the document we are unable to complete this request for Mammogram because the documentation does not have the result(s) needed to close the requested care gap(s). Left breast only    Any additional questions or concerns should be emailed to the Practice Liaisons via the appropriate education email address, please do not reply via In Basket.    Thank you  Leandra Jansen MA   [facility]

## 2025-01-08 NOTE — TELEPHONE ENCOUNTER
----- Message from Kristina OCHOA sent at 1/6/2025  6:32 AM EST -----  Regarding: Quality Update  01/06/25 6:32 AM    Fátima, our patient Esther Angeles has had Pap Smear (HPV) aka Cervical Cancer Screening completed/performed. Please assist in updating the patient chart by pulling the Care Everywhere (CE) document. The date of service is 09/12/2024.     Thank you,  NANDO Malik [facility]

## 2025-01-08 NOTE — TELEPHONE ENCOUNTER
----- Message from Kristina OCHOA sent at 1/6/2025  6:33 AM EST -----  Regarding: Quality Update  01/06/25 6:33 AM    Fátima, our patient Esther Angeles has had Mammogram completed/performed. Please assist in updating the patient chart by pulling the Care Everywhere (CE) document. The date of service is 10/03/2024.     Thank you,  NANDO Malik [facility]

## 2025-01-10 ENCOUNTER — OFFICE VISIT (OUTPATIENT)
Dept: FAMILY MEDICINE CLINIC | Facility: CLINIC | Age: 50
End: 2025-01-10

## 2025-01-10 VITALS
WEIGHT: 210 LBS | TEMPERATURE: 98.6 F | OXYGEN SATURATION: 99 % | DIASTOLIC BLOOD PRESSURE: 76 MMHG | HEIGHT: 64 IN | SYSTOLIC BLOOD PRESSURE: 120 MMHG | BODY MASS INDEX: 35.85 KG/M2 | HEART RATE: 90 BPM

## 2025-01-10 DIAGNOSIS — R73.03 PREDIABETES: ICD-10-CM

## 2025-01-10 DIAGNOSIS — Z00.00 ANNUAL PHYSICAL EXAM: Primary | ICD-10-CM

## 2025-01-10 DIAGNOSIS — Z23 ENCOUNTER FOR VACCINATION: ICD-10-CM

## 2025-01-10 DIAGNOSIS — E78.2 MIXED HYPERLIPIDEMIA: ICD-10-CM

## 2025-01-10 DIAGNOSIS — I10 BENIGN ESSENTIAL HYPERTENSION: ICD-10-CM

## 2025-01-10 DIAGNOSIS — M79.604 RIGHT LEG PAIN: ICD-10-CM

## 2025-01-10 DIAGNOSIS — E66.9 OBESITY (BMI 30-39.9): ICD-10-CM

## 2025-01-10 LAB — SL AMB POCT HEMOGLOBIN AIC: 6.3 (ref ?–6.5)

## 2025-01-10 PROCEDURE — 99213 OFFICE O/P EST LOW 20 MIN: CPT | Performed by: STUDENT IN AN ORGANIZED HEALTH CARE EDUCATION/TRAINING PROGRAM

## 2025-01-10 PROCEDURE — 83036 HEMOGLOBIN GLYCOSYLATED A1C: CPT | Performed by: STUDENT IN AN ORGANIZED HEALTH CARE EDUCATION/TRAINING PROGRAM

## 2025-01-10 PROCEDURE — 99396 PREV VISIT EST AGE 40-64: CPT | Performed by: STUDENT IN AN ORGANIZED HEALTH CARE EDUCATION/TRAINING PROGRAM

## 2025-01-10 RX ORDER — POTASSIUM CHLORIDE 1500 MG/1
20 TABLET, EXTENDED RELEASE ORAL DAILY
Qty: 90 TABLET | Refills: 3 | Status: SHIPPED | OUTPATIENT
Start: 2025-01-10

## 2025-02-24 DIAGNOSIS — I10 BENIGN ESSENTIAL HYPERTENSION: ICD-10-CM

## 2025-02-24 RX ORDER — CHLORTHALIDONE 25 MG/1
25 TABLET ORAL DAILY
Qty: 90 TABLET | Refills: 3 | Status: SHIPPED | OUTPATIENT
Start: 2025-02-24

## 2025-02-24 RX ORDER — LOSARTAN POTASSIUM 100 MG/1
100 TABLET ORAL DAILY
Qty: 90 TABLET | Refills: 3 | Status: SHIPPED | OUTPATIENT
Start: 2025-02-24

## 2025-04-08 ENCOUNTER — PATIENT MESSAGE (OUTPATIENT)
Dept: FAMILY MEDICINE CLINIC | Facility: CLINIC | Age: 50
End: 2025-04-08

## 2025-04-08 DIAGNOSIS — I10 BENIGN ESSENTIAL HYPERTENSION: ICD-10-CM

## 2025-04-08 DIAGNOSIS — E66.9 OBESITY (BMI 30-39.9): Primary | ICD-10-CM

## 2025-04-09 NOTE — TELEPHONE ENCOUNTER
Would you please fax patient's lab orders to [facility] off of [facility]. Thanks!

## 2025-04-10 LAB
ALBUMIN SERPL-MCNC: 3.9 G/DL (ref 3.6–5.1)
ALBUMIN/GLOB SERPL: 1.2 (CALC) (ref 1–2.5)
ALP SERPL-CCNC: 69 U/L (ref 31–125)
ALT SERPL-CCNC: 14 U/L (ref 6–29)
AST SERPL-CCNC: 11 U/L (ref 10–35)
BILIRUB SERPL-MCNC: 0.5 MG/DL (ref 0.2–1.2)
BUN SERPL-MCNC: 11 MG/DL (ref 7–25)
BUN/CREAT SERPL: ABNORMAL (CALC) (ref 6–22)
CALCIUM SERPL-MCNC: 9 MG/DL (ref 8.6–10.2)
CHLORIDE SERPL-SCNC: 101 MMOL/L (ref 98–110)
CO2 SERPL-SCNC: 26 MMOL/L (ref 20–32)
CORTIS AM PEAK SERPL-MCNC: 13.6 MCG/DL
CREAT SERPL-MCNC: 0.64 MG/DL (ref 0.5–0.99)
GFR/BSA.PRED SERPLBLD CYS-BASED-ARV: 109 ML/MIN/1.73M2
GLOBULIN SER CALC-MCNC: 3.2 G/DL (CALC) (ref 1.9–3.7)
GLUCOSE SERPL-MCNC: 103 MG/DL (ref 65–99)
HBA1C MFR BLD: 6.4 % OF TOTAL HGB
POTASSIUM SERPL-SCNC: 3.4 MMOL/L (ref 3.5–5.3)
PROT SERPL-MCNC: 7.1 G/DL (ref 6.1–8.1)
SODIUM SERPL-SCNC: 137 MMOL/L (ref 135–146)

## 2025-04-11 ENCOUNTER — RESULTS FOLLOW-UP (OUTPATIENT)
Dept: FAMILY MEDICINE CLINIC | Facility: CLINIC | Age: 50
End: 2025-04-11

## 2025-04-11 DIAGNOSIS — E87.6 LOW SERUM POTASSIUM: ICD-10-CM

## 2025-04-11 DIAGNOSIS — I10 BENIGN ESSENTIAL HYPERTENSION: Primary | ICD-10-CM
